# Patient Record
Sex: FEMALE | Race: WHITE | NOT HISPANIC OR LATINO | Employment: STUDENT | URBAN - METROPOLITAN AREA
[De-identification: names, ages, dates, MRNs, and addresses within clinical notes are randomized per-mention and may not be internally consistent; named-entity substitution may affect disease eponyms.]

---

## 2023-05-24 ENCOUNTER — OFFICE VISIT (OUTPATIENT)
Dept: PRIMARY CARE | Facility: CLINIC | Age: 23
End: 2023-05-24
Payer: COMMERCIAL

## 2023-05-24 VITALS
DIASTOLIC BLOOD PRESSURE: 80 MMHG | WEIGHT: 216 LBS | HEIGHT: 68 IN | TEMPERATURE: 96.8 F | HEART RATE: 91 BPM | OXYGEN SATURATION: 99 % | BODY MASS INDEX: 32.74 KG/M2 | SYSTOLIC BLOOD PRESSURE: 120 MMHG

## 2023-05-24 DIAGNOSIS — K52.9 ACUTE GASTROENTERITIS: Primary | ICD-10-CM

## 2023-05-24 PROCEDURE — 1036F TOBACCO NON-USER: CPT | Performed by: FAMILY MEDICINE

## 2023-05-24 PROCEDURE — 99213 OFFICE O/P EST LOW 20 MIN: CPT | Performed by: FAMILY MEDICINE

## 2023-05-24 RX ORDER — ONDANSETRON 4 MG/1
4 TABLET, ORALLY DISINTEGRATING ORAL EVERY 4 HOURS PRN
COMMUNITY
Start: 2023-05-21 | End: 2023-05-24 | Stop reason: ALTCHOICE

## 2023-05-24 RX ORDER — DICYCLOMINE HYDROCHLORIDE 10 MG/1
CAPSULE ORAL
COMMUNITY
Start: 2022-11-21 | End: 2023-05-24 | Stop reason: ALTCHOICE

## 2023-05-24 RX ORDER — AZELASTINE 1 MG/ML
SPRAY, METERED NASAL 2 TIMES DAILY
COMMUNITY
Start: 2023-05-23 | End: 2024-06-06 | Stop reason: SDUPTHER

## 2023-05-24 ASSESSMENT — LIFESTYLE VARIABLES
HOW OFTEN DO YOU HAVE SIX OR MORE DRINKS ON ONE OCCASION: NEVER
HOW OFTEN DO YOU HAVE A DRINK CONTAINING ALCOHOL: NEVER

## 2023-05-24 ASSESSMENT — ENCOUNTER SYMPTOMS
DIARRHEA: 1
NAUSEA: 1

## 2023-05-24 ASSESSMENT — PATIENT HEALTH QUESTIONNAIRE - PHQ9
2. FEELING DOWN, DEPRESSED OR HOPELESS: NOT AT ALL
1. LITTLE INTEREST OR PLEASURE IN DOING THINGS: NOT AT ALL
SUM OF ALL RESPONSES TO PHQ9 QUESTIONS 1 & 2: 0

## 2023-05-24 ASSESSMENT — PAIN SCALES - GENERAL: PAINLEVEL: 0-NO PAIN

## 2023-05-24 NOTE — PROGRESS NOTES
Subjective   Patient ID: Becky Sanchez is a 22 y.o. female who presents for Follow-up (Patient is here for follow up from urgent care for vomiting ).  HPI  22-year-old female presents for urgent care follow-up for acute gastroenteritis most likely due to something she ate.  Symptoms mostly resolved, she is still having occasional nausea and diarrhea.  No fever or chills.  No abdominal pain.  Review of Systems   Gastrointestinal:  Positive for diarrhea and nausea.   All other systems reviewed and are negative.      Visit Vitals  /80 (BP Location: Left arm, Patient Position: Sitting, BP Cuff Size: Adult)   Pulse 91   Temp 36 °C (96.8 °F) (Temporal)        Objective   Physical Exam  Vitals reviewed.   Constitutional:       Appearance: Normal appearance.   Cardiovascular:      Rate and Rhythm: Normal rate and regular rhythm.      Heart sounds: Normal heart sounds.   Pulmonary:      Breath sounds: Normal breath sounds.   Abdominal:      General: Bowel sounds are normal. There is no distension.      Palpations: Abdomen is soft.      Tenderness: There is no abdominal tenderness. There is no guarding or rebound.   Neurological:      Mental Status: She is alert.         Assessment/Plan   Problem List Items Addressed This Visit          Digestive    Acute gastroenteritis - Primary        Over-the-counter Nexium for 2 weeks for acute gastroenteritis.  Imodium if necessary.

## 2023-07-19 ENCOUNTER — APPOINTMENT (OUTPATIENT)
Dept: PRIMARY CARE | Facility: CLINIC | Age: 23
End: 2023-07-19
Payer: COMMERCIAL

## 2023-08-04 ENCOUNTER — OFFICE VISIT (OUTPATIENT)
Dept: PRIMARY CARE | Facility: CLINIC | Age: 23
End: 2023-08-04
Payer: COMMERCIAL

## 2023-08-04 VITALS
BODY MASS INDEX: 32.89 KG/M2 | OXYGEN SATURATION: 99 % | HEART RATE: 96 BPM | TEMPERATURE: 97.7 F | SYSTOLIC BLOOD PRESSURE: 118 MMHG | WEIGHT: 217 LBS | DIASTOLIC BLOOD PRESSURE: 86 MMHG | HEIGHT: 68 IN

## 2023-08-04 DIAGNOSIS — R35.0 URINARY FREQUENCY: ICD-10-CM

## 2023-08-04 DIAGNOSIS — Z00.00 ROUTINE GENERAL MEDICAL EXAMINATION AT A HEALTH CARE FACILITY: Primary | ICD-10-CM

## 2023-08-04 PROBLEM — R10.30 INTERMITTENT LOWER ABDOMINAL PAIN: Status: ACTIVE | Noted: 2023-08-04

## 2023-08-04 PROBLEM — H93.8X3 SENSATION OF FULLNESS IN BOTH EARS: Status: ACTIVE | Noted: 2023-08-04

## 2023-08-04 PROBLEM — N89.8 VAGINAL ITCHING: Status: ACTIVE | Noted: 2023-08-04

## 2023-08-04 PROBLEM — J02.9 SORE THROAT: Status: ACTIVE | Noted: 2023-08-04

## 2023-08-04 PROBLEM — B34.9 VIRAL ILLNESS: Status: ACTIVE | Noted: 2023-08-04

## 2023-08-04 PROBLEM — H66.91 OTITIS MEDIA, RIGHT: Status: ACTIVE | Noted: 2023-08-04

## 2023-08-04 PROBLEM — H92.03 OTALGIA OF BOTH EARS: Status: ACTIVE | Noted: 2023-08-04

## 2023-08-04 PROBLEM — R09.81 NASAL CONGESTION: Status: ACTIVE | Noted: 2023-08-04

## 2023-08-04 PROBLEM — F41.1 ANXIETY, GENERALIZED: Status: ACTIVE | Noted: 2023-08-04

## 2023-08-04 PROBLEM — R10.13 DYSPEPSIA: Status: ACTIVE | Noted: 2023-08-04

## 2023-08-04 PROBLEM — J30.2 SEASONAL ALLERGIC RHINITIS: Status: ACTIVE | Noted: 2023-08-04

## 2023-08-04 PROBLEM — H90.3 BILATERAL SENSORINEURAL HEARING LOSS: Status: ACTIVE | Noted: 2023-08-04

## 2023-08-04 PROBLEM — J34.89 NASAL DRAINAGE: Status: ACTIVE | Noted: 2023-08-04

## 2023-08-04 PROBLEM — H69.93 DYSFUNCTION OF BOTH EUSTACHIAN TUBES: Status: ACTIVE | Noted: 2023-08-04

## 2023-08-04 PROCEDURE — 99395 PREV VISIT EST AGE 18-39: CPT | Performed by: FAMILY MEDICINE

## 2023-08-04 PROCEDURE — 1036F TOBACCO NON-USER: CPT | Performed by: FAMILY MEDICINE

## 2023-08-04 PROCEDURE — 87086 URINE CULTURE/COLONY COUNT: CPT

## 2023-08-04 RX ORDER — DICYCLOMINE HYDROCHLORIDE 10 MG/1
CAPSULE ORAL
COMMUNITY
Start: 2022-11-21 | End: 2023-08-04 | Stop reason: ALTCHOICE

## 2023-08-04 RX ORDER — SPIRONOLACTONE 100 MG/1
TABLET, FILM COATED ORAL
COMMUNITY
Start: 2023-07-30

## 2023-08-04 RX ORDER — MONTELUKAST SODIUM 10 MG/1
10 TABLET ORAL NIGHTLY
COMMUNITY
Start: 2023-07-27 | End: 2023-10-12 | Stop reason: SDUPTHER

## 2023-08-04 ASSESSMENT — PAIN SCALES - GENERAL: PAINLEVEL: 0-NO PAIN

## 2023-08-04 NOTE — PROGRESS NOTES
Subjective   Patient ID: Becky Sanchez is a 22 y.o. female who presents for Annual Exam (Patient is here for CPE, patient is Not fasting. ).  HPI  22-year-old female for complete physical exam.  Her only complaint is increased urinary frequency.  Review of Systems  Constitutional: no chills, no fever and no night sweats.   Eyes: no blurred vision and no eyesight problems.   ENT: no hearing loss, no nasal congestion, no nasal discharge, no hoarseness and no sore throat.   Cardiovascular: no chest pain, no intermittent leg claudication, no lower extremity edema, no palpitations and no syncope.   Respiratory: no cough, no shortness of breath during exertion, no shortness of breath at rest and no wheezing.   Gastrointestinal: no abdominal pain, no blood in stools, no constipation, no diarrhea, no melena, no nausea, no rectal pain and no vomiting.   Genitourinary: no dysuria, no change in urinary frequency, no urinary hesitancy and no feelings of urinary urgency.   Neurological: no difficulty walking, no headache, no limb weakness, no numbness and no tingling.   Psychiatric: no anxiety, no depression, no anhedonia and no substance use disorders.   Endocrine: no recent weight gain and no recent weight loss.   Hematologic/Lymphatic: no tendency for easy bruising and no swollen glands.  Visit Vitals  /90 (BP Location: Left arm, Patient Position: Sitting, BP Cuff Size: Adult)   Pulse 96   Temp 36.5 °C (97.7 °F) (Temporal)        Objective   Physical Exam  Constitutional: Alert and in no acute distress. Well developed, well nourished.   Eyes: Pupils were equal in size, round, reactive to light (PERRL) with normal accommodation and extraocular movements intact (EOMI). Ophthalmoscopic examination: Normal.   Ears, Nose, Mouth, and Throat: External inspection of ears and nose: Normal. Otoscopic examination: Normal. Lips, teeth, and gums: Normal. Oropharynx: Normal.   Neck: No neck mass was observed. Supple. Thyroid not enlarged  and there were no palpable thyroid nodules.   Cardiovascular: Heart rate and rhythm were normal, normal S1 and S2, no gallops, no murmurs and no pericardial rub. Carotid pulses: Normal with no bruits. Abdominal aorta: Normal. Pedal pulses: Normal. No peripheral edema.   Pulmonary: No respiratory distress. Clear bilateral breath sounds.   Abdomen: Soft nontender; no abdominal mass palpated. Normal bowel sounds. No organomegaly.   Skin: Normal skin color and pigmentation, normal skin turgor, and no rash.   Psychiatric: Judgment and insight: Intact. Mood and affect: Normal.  Assessment/Plan   Problem List Items Addressed This Visit       Routine general medical examination at a health care facility - Primary    Urinary frequency    Relevant Orders    Urine Culture

## 2023-08-05 LAB — URINE CULTURE: NORMAL

## 2023-10-12 DIAGNOSIS — J30.1 SEASONAL ALLERGIC RHINITIS DUE TO POLLEN: Primary | ICD-10-CM

## 2023-10-12 RX ORDER — MONTELUKAST SODIUM 10 MG/1
10 TABLET ORAL NIGHTLY
Qty: 90 TABLET | Refills: 1 | Status: SHIPPED | OUTPATIENT
Start: 2023-10-12

## 2023-12-28 ENCOUNTER — TELEPHONE (OUTPATIENT)
Dept: OBSTETRICS AND GYNECOLOGY | Facility: CLINIC | Age: 23
End: 2023-12-28
Payer: COMMERCIAL

## 2024-01-02 ENCOUNTER — TELEPHONE (OUTPATIENT)
Dept: OBSTETRICS AND GYNECOLOGY | Facility: CLINIC | Age: 24
End: 2024-01-02
Payer: COMMERCIAL

## 2024-01-02 DIAGNOSIS — N92.1 IRREGULAR INTERMENSTRUAL BLEEDING: Primary | ICD-10-CM

## 2024-01-02 NOTE — TELEPHONE ENCOUNTER
Patient is calling wanting to get in before she goes back to school on the 17th for vaginal spotting. Does not take birth control. We do not have anything available before then. She would like to speak with you regarding some family history of cancer on both mom and dads side so she is concerned about the spotting.

## 2024-01-03 NOTE — TELEPHONE ENCOUNTER
Left a voicemail.  Reassurance given that spotting in a 23-year-old is not a concern for cancer.  It will most typically be hormonal.  I ordered a pelvic ultrasound.  Also blood work for TSH, prolactin, DHEA-S and hemoglobin A1c.

## 2024-01-03 NOTE — TELEPHONE ENCOUNTER
I left a voicemail at the number listed which was for the patient.  It appears she is not on contraception.  If it is a cyclical/midcycle bleeding, then is likely from ovulation.  If there is pain, discharge, odor then other sources such as infection need to be considered..  I recommend call if her symptoms are worsening.  She may want to consider contraception such as birth control pills to help with cycle regularity and acne.

## 2024-01-04 ENCOUNTER — LAB (OUTPATIENT)
Dept: LAB | Facility: LAB | Age: 24
End: 2024-01-04
Payer: COMMERCIAL

## 2024-01-04 DIAGNOSIS — N92.1 IRREGULAR INTERMENSTRUAL BLEEDING: ICD-10-CM

## 2024-01-04 LAB
DHEA-S SERPL-MCNC: 194 UG/DL (ref 65–395)
EST. AVERAGE GLUCOSE BLD GHB EST-MCNC: 82 MG/DL
HBA1C MFR BLD: 4.5 %
PROLACTIN SERPL-MCNC: 9.6 UG/L (ref 3–20)
TSH SERPL-ACNC: 3.25 MIU/L (ref 0.44–3.98)

## 2024-01-04 PROCEDURE — 36415 COLL VENOUS BLD VENIPUNCTURE: CPT

## 2024-01-04 PROCEDURE — 82627 DEHYDROEPIANDROSTERONE: CPT

## 2024-01-04 PROCEDURE — 84146 ASSAY OF PROLACTIN: CPT

## 2024-01-04 PROCEDURE — 84443 ASSAY THYROID STIM HORMONE: CPT

## 2024-01-04 PROCEDURE — 83036 HEMOGLOBIN GLYCOSYLATED A1C: CPT

## 2024-06-06 ENCOUNTER — OFFICE VISIT (OUTPATIENT)
Dept: ALLERGY | Facility: CLINIC | Age: 24
End: 2024-06-06
Payer: COMMERCIAL

## 2024-06-06 VITALS
SYSTOLIC BLOOD PRESSURE: 116 MMHG | BODY MASS INDEX: 33.8 KG/M2 | HEIGHT: 68 IN | OXYGEN SATURATION: 98 % | HEART RATE: 74 BPM | TEMPERATURE: 98.4 F | RESPIRATION RATE: 17 BRPM | WEIGHT: 223 LBS | DIASTOLIC BLOOD PRESSURE: 80 MMHG

## 2024-06-06 DIAGNOSIS — H10.13 ALLERGIC CONJUNCTIVITIS, BILATERAL: Primary | ICD-10-CM

## 2024-06-06 DIAGNOSIS — H10.13 ALLERGIC CONJUNCTIVITIS OF BOTH EYES: Primary | ICD-10-CM

## 2024-06-06 DIAGNOSIS — J30.81 ALLERGIC RHINITIS DUE TO ANIMAL (CAT) (DOG) HAIR AND DANDER: ICD-10-CM

## 2024-06-06 PROCEDURE — 99214 OFFICE O/P EST MOD 30 MIN: CPT | Performed by: ALLERGY & IMMUNOLOGY

## 2024-06-06 RX ORDER — AZELASTINE HYDROCHLORIDE 0.5 MG/ML
1 SOLUTION/ DROPS OPHTHALMIC 2 TIMES DAILY
Qty: 18 ML | Refills: 3 | Status: SHIPPED | OUTPATIENT
Start: 2024-06-06 | End: 2024-09-04

## 2024-06-06 RX ORDER — AZELASTINE 1 MG/ML
2 SPRAY, METERED NASAL 2 TIMES DAILY
Qty: 30 ML | Refills: 11 | Status: SHIPPED | OUTPATIENT
Start: 2024-06-06 | End: 2024-06-06

## 2024-06-06 RX ORDER — CLINDAMYCIN PHOSPHATE 10 UG/ML
LOTION TOPICAL 2 TIMES DAILY
COMMUNITY
Start: 2023-12-19

## 2024-06-06 RX ORDER — TRETINOIN 1 MG/G
CREAM TOPICAL NIGHTLY
COMMUNITY
Start: 2023-12-28

## 2024-06-06 RX ORDER — AZELASTINE 1 MG/ML
2 SPRAY, METERED NASAL 2 TIMES DAILY
Qty: 90 ML | Refills: 2 | Status: SHIPPED | OUTPATIENT
Start: 2024-06-06

## 2024-06-06 RX ORDER — AZELAIC ACID 0.15 G/G
1 GEL TOPICAL 2 TIMES DAILY
COMMUNITY
Start: 2023-12-28

## 2024-06-06 RX ORDER — KETOTIFEN FUMARATE 0.35 MG/ML
1 SOLUTION/ DROPS OPHTHALMIC 2 TIMES DAILY
Qty: 10 ML | Refills: 1 | Status: SHIPPED | OUTPATIENT
Start: 2024-06-06 | End: 2024-09-04

## 2024-06-06 NOTE — PROGRESS NOTES
Patient ID: Becky Sanchez is a 23 y.o. female.     Chief Complaint: follow up  History Of Present Illness  Becky Sanchez is a 23 y.o. female with PMx allergic rhinitis presenting for follow up.   Food Allergy  No    Eczema/ Atopic Dermatitis  No    Asthma  No    Rhinoconjunctivitis  Cat, dog, dust mite and grass.  Worse since being home this summer around her dog.  Doing a one year master's program and living at home.  Flonase, Xyzal (doesn't feel it works as well as Zyrtec).  Left eye has been getting a little red and itchy.    Drug Allergy   No    Insect Allergy   No    Infections  No history of frequent or recurrent infections      Review of Systems    Pertinent positives and negatives have been assessed in the HPI. All other systems have been reviewed and are negative except as noted in the HPI.    Allergies  Patient has no known allergies.    Past Medical History  She has a past medical history of Acute serous otitis media, left ear (03/13/2020), Acute upper respiratory infection, unspecified (01/27/2020), Headache, unspecified (04/15/2022), Nausea (12/06/2020), Otalgia, bilateral (01/27/2020), Otalgia, right ear (01/27/2020), Otitis media, unspecified, right ear (05/18/2022), Pain in right knee (09/21/2016), Personal history of other diseases of the digestive system (01/12/2022), Personal history of other diseases of the female genital tract (05/17/2022), Strain of muscle, fascia and tendon of the posterior muscle group at thigh level, unspecified thigh, initial encounter (06/17/2017), and Unspecified abdominal pain (12/06/2020).    Family History  No family history on file.    No history of food allergy or atopic disease in the family.    Surgical History  She has no past surgical history on file.    Social/Environmental History  She reports that she has never smoked. She has never used smokeless tobacco. She reports that she does not drink alcohol and does not use drugs.    Home: Lives in a house   Floors: Wood and  "carpeting mixed  Air Conditioning: Central  Smoker: No  Pets: Dog sleeps in her bedroom  Infestations: No  Molds: No  Occupation: student    MEDICATIONS  Current Outpatient Medications on File Prior to Visit   Medication Sig Dispense Refill    azelaic acid (Finacea) 15 % gel Apply 1 Application topically 2 times a day.      azelastine (Astelin) 137 mcg (0.1 %) nasal spray Administer into affected nostril(s) twice a day.      cetirizine (ZYRTEC) 10 mg capsule Take by mouth.      clindamycin (Cleocin T) 1 % lotion 2 times a day.      tretinoin (Retin-A) 0.1 % cream Apply topically once daily at bedtime.      montelukast (Singulair) 10 mg tablet Take 1 tablet (10 mg) by mouth once daily at bedtime. (Patient not taking: Reported on 6/6/2024) 90 tablet 1    spironolactone (Aldactone) 100 mg tablet        No current facility-administered medications on file prior to visit.         Physical Exam  Visit Vitals  /80   Pulse 74   Temp 36.9 °C (98.4 °F) (Temporal)   Resp 17   Ht 1.727 m (5' 8\")   Wt 101 kg (223 lb)   SpO2 98%   BMI 33.91 kg/m²   Smoking Status Never   BSA 2.2 m²       Wt Readings from Last 1 Encounters:   06/06/24 101 kg (223 lb)       Physical Exam    General: Well appearing, no acute distress  Head: Normocephalic, atraumatic, neck supple without lymphadenopathy  Eyes: EOMI, non-injected  Nose: No nasal crease, nares patent, slightly boggy turbinates, minimal discharge  Throat: Normal dentition, no erythema  Heart: Regular rate and rhythm  Lungs: Clear to auscultation bilaterally, effort normal  Abdomen: Soft, non-tender, normal bowel sounds  Extremities: Moves all extremities symmetrically, no edema  Skin: No rashes/lesions  Psych: normal mood and affect    LAB RESULTS:      HEME/ENDO:  Recent Labs     01/04/24  1036   TSH 3.25   HGBA1C 4.5     Assessment/Plan   Becky is a 24 yo woman with a history of allergic rhinitis to cat, dog, dust mite and grass. She has returned home after graduating from [place] [employer] and is working on her master's degree in teaching. At this time she is living with her parents and the family dog sleeps with her leading to an increase in symptoms. She is doubtful this will change.  -we discussed additional avoidance measures and I recommended daily allergy medications. Will add eye drops for intermittent allergic conjunctivitis symptoms  -we discussed the consideration for allergy immunotherapy.    Plan follow up yearly, but immunotherapy could be started at any time.    Yue Frank, DO

## 2024-06-06 NOTE — PATIENT INSTRUCTIONS
"Continue with flonase. Switch back to Zyrtec. Can use 2 times a day if needed, side effect will be tired.  Add in Astelin nasal spray as needed  Allergy eye drops as needed.      For dust mite allergy, be sure to wash your bedding, including your sheets, weekly in hot water, in order to reduce dust mites.  Encase your pillow and mattress in a hypoallergenic or anti-dust mite case.      You may consider a HEPA filter if you do not have one, and if possible, put it in the patient's bedroom.      If you have pets, avoid having them in the bedroom if possible.  Regular grooming and wiped the pet with a pet allergy wipe will help with dander and allergens on the pet's fur.  Vacuum regularly.    Keep humidity in the home 30-50% to decrease mold growth.  Clean bathrooms with a bleach solution and fix any leaking faucets.  You may also wipe down windowsills with  bleach solution.    For pollen allergy, keep windows closed and use central air.  You can consider wearing a hat and sunglasses as well to reduce pollen exposure.  After being outside, wash hands and face or shower to reduce the pollen on your body.  Wash clothing after wearing outside during the pollen seasons.       Allergy Shots     Allergen immunotherapy injections or \"allergy shots\" are prescribed for patients with allergic rhinitis (hay fever), allergic asthma or life threatening reactions to insect stings.  Immunotherapy is the only medical treatment that could potentially modify allergic disease.  Some studies have shown that it may have a preventive role in allergic children, possibly preventing asthma from developing in some patients with allergic rhinitis.  Immunotherapy would be considered for individuals, who have moderate or severe symptoms not adequately controlled by environmental control measures and/or medications.     Effectiveness    Allergen immunotherapy (allergy shots) may \"turn down\" allergic reactions to common allergens including pollens, " molds, animal dander and dust mites.  In most cases, the initial 6 to 12 month course of allergy shots is likely to gradually decrease sensitivity to airborne allergens and continuation of injections leads to further improvement.  The injections diminish sensitivities, resulting in fewer symptoms and use of fewer medications.  It is important to maintain shots at the proper time interval; missing your shots for a short time may be acceptable but an appropriate adjustment in the dose of vaccine may be necessary for long lapses in injections.  Please see us if you miss receiving your injections for longer than what is recommended for your current vial.     How long are shots given?     There are generally two phases to immunotherapy:  a build-up phase and a maintenance phase    Build-up phase: involves receiving injections with increasing amounts of the allergens.  The frequency of injections during this phase generally ranges from 1 to 2 times a week, though more rapid build-up schedules are sometimes used.  The duration of this phase depends on the frequency of the injections but generally ranges from 3 to 6 months (at a frequency of 2 times and 1 time a week, respectively).     Maintenance phase: This phase begins when the effective therapeutic dose is reached.  The effective therapeutic dose is based on recommendations from a national collaborative committee called the Joint Task Force for Practice Parameters: Allergen immunotherapy: A Practice Parameter and was determined after review of a number of published studies on immunotherapy.  The effective maintenance dose may be individualized for a particular person based on their degree of sensitivity (how ' allergic they are' to the allergens in their vaccine) and their response to the immunotherapy build-up phase.  Once the target maintenance dose is reached, the intervals between the allergy injections can be increased.  The intervals between maintenance  immunotherapy injections generally ranges from every 2 to every 4 weeks but should be individualized to provide the best combination of effectiveness and safety for each person.  [place] intervals between allergy injections may lead to fewer reactions an greater benefit in some people and some individuals may tolerate intervals longer than four weeks between injections.     Reactions to allergy injections    It is possible to have an allergic reaction to the allergy injection itself.  Reactions can be local (swelling at the injection site) or systemic (affecting the rest of the body).  Systemic reactions include hay fever type symptoms, hives, flushing, lightheadedness, and/or asthma, and rarely, life threatening reactions.   Some condition can make allergic reactions to the injections more likely: heavy natural exposure to pollen during a pollen season and exercise after an injection.  Serious systemic reactions can occur in patients with asthma that has worsened and is not well controlled on recommended medications.  Therefore, if you have noted worsening of your asthma symptoms, notify your nurse or physician before receiving your scheduled injections!  Reactions to injections can occur, however, even in the absence of these conditions.      Please inform the nursing staff if you have been diagnosed with a new medical condition or prescribed any new medications since your last visit.  If any symptoms occur immediately or within hours of your injection, please inform the nurse before you receive your next injection.                   CHARGES    1. Charges for vaccine depend on the concentration and number of vials.  2. All vaccine must be paid for at the time it is ordered.  3. If we have a contract with your insurance plan, we will submit the claim.  4. If your vaccine is covered under a prescription plan, you will need to pay for your vaccine up front.  Please obtain a form from your employer and we will fill it  out and submit it to your prescription plan.  5. Any co-payments or deductibles not covered by your insurance company are your responsibility.  Some insurance plans do not cover allergy vaccine.  Please check with your insurance company about coverage before starting vaccine therapy.  6. If you ask us to make your vaccine without proper insurance authorization or you are not sure whether you are going to start allergy injections you will be responsible for the cost.    OUR RESPONSIBILITY    It is our responsibility to be sure you are receiving the correct vaccine and the correct dosage.  We will treat any problems that may arise from your allergy injections.  We will answer concerns you may have about your individual course of treatment.  Our office is available to answer questions you may have regarding your allergy injections.  Please call us if you have any concerns, but for any emergencies, please call [phone].    YOUR RESPONSIBILITY    1. You need to let us know if you have any difficulty with your injections, including a local skin reaction to the injection, worsening of your allergy symptoms after an injection or signs of a systemic reaction.  2. Do not get your allergy shot if you are ill.  If you have a temperature or coughing, wheezing or experiencing shortness of breath up to forty-eight hours before your injection, you will not receive your allergy injection.  Please call us before you come in and we can let you know whether you should postpone your injection.  3. Do not participate in excessive activity/exercise for two to three hours after your injection.  This may increase absorption of the injection and may lead to an adverse reaction.  4. You are required to wait thirty minutes after your injection.  There are no exceptions to this rule.  If you are unable to stay the thirty minutes, please reschedule your injection appointment.    5. You must have your injection site checked by the medical assistant or  nurse prior to leaving the office.  6. If you experience any symptoms that make you think you are having a reaction to the injections, please let us know immediately.  Do not wait thirty minutes to let us know.  7. Please make us aware of any changes in your medications, especially if related to blood pressure, migraine headaches or heart conditions.

## 2024-08-12 ENCOUNTER — APPOINTMENT (OUTPATIENT)
Dept: PRIMARY CARE | Facility: CLINIC | Age: 24
End: 2024-08-12
Payer: COMMERCIAL

## 2024-08-12 VITALS
HEART RATE: 94 BPM | TEMPERATURE: 97.3 F | WEIGHT: 221 LBS | BODY MASS INDEX: 33.49 KG/M2 | OXYGEN SATURATION: 100 % | DIASTOLIC BLOOD PRESSURE: 78 MMHG | SYSTOLIC BLOOD PRESSURE: 120 MMHG | HEIGHT: 68 IN

## 2024-08-12 DIAGNOSIS — Z00.00 ROUTINE GENERAL MEDICAL EXAMINATION AT A HEALTH CARE FACILITY: Primary | ICD-10-CM

## 2024-08-12 PROCEDURE — 3008F BODY MASS INDEX DOCD: CPT | Performed by: FAMILY MEDICINE

## 2024-08-12 PROCEDURE — 99395 PREV VISIT EST AGE 18-39: CPT | Performed by: FAMILY MEDICINE

## 2024-08-12 PROCEDURE — 1036F TOBACCO NON-USER: CPT | Performed by: FAMILY MEDICINE

## 2024-08-12 ASSESSMENT — PAIN SCALES - GENERAL: PAINLEVEL: 0-NO PAIN

## 2024-08-12 NOTE — PROGRESS NOTES
Subjective   Patient ID: Becky Sanchez is a 23 y.o. female who presents for Annual Exam (Patient is present today for annual physical exam.).  HPI  23-year-old female presents for complete physical exam.  No acute complaints  Review of Systems  Constitutional: no chills, no fever and no night sweats.   Eyes: no blurred vision and no eyesight problems.   ENT: no hearing loss, no nasal congestion, no nasal discharge, no hoarseness and no sore throat.   Cardiovascular: no chest pain, no intermittent leg claudication, no lower extremity edema, no palpitations and no syncope.   Respiratory: no cough, no shortness of breath during exertion, no shortness of breath at rest and no wheezing.   Gastrointestinal: no abdominal pain, no blood in stools, no constipation, no diarrhea, no melena, no nausea, no rectal pain and no vomiting.   Genitourinary: no dysuria, no change in urinary frequency, no urinary hesitancy and no feelings of urinary urgency.   Neurological: no difficulty walking, no headache, no limb weakness, no numbness and no tingling.   Psychiatric: no anxiety, no depression, no anhedonia and no substance use disorders.   Endocrine: no recent weight gain and no recent weight loss.   Hematologic/Lymphatic: no tendency for easy bruising and no swollen glands.  Visit Vitals  /78 (BP Location: Left arm, Patient Position: Sitting, BP Cuff Size: Large adult)   Pulse 94   Temp 36.3 °C (97.3 °F) (Temporal)        Objective   Physical Exam  Constitutional: Alert and in no acute distress. Well developed, well nourished.   Eyes: Pupils were equal in size, round, reactive to light (PERRL) with normal accommodation and extraocular movements intact (EOMI). Ophthalmoscopic examination: Normal.   Ears, Nose, Mouth, and Throat: External inspection of ears and nose: Normal. Otoscopic examination: Normal. Lips, teeth, and gums: Normal. Oropharynx: Normal.   Neck: No neck mass was observed. Supple. Thyroid not enlarged and there were  no palpable thyroid nodules.   Cardiovascular: Heart rate and rhythm were normal, normal S1 and S2, no gallops, no murmurs and no pericardial rub. Carotid pulses: Normal with no bruits. Abdominal aorta: Normal. Pedal pulses: Normal. No peripheral edema.   Pulmonary: No respiratory distress. Clear bilateral breath sounds.   Abdomen: Soft nontender; no abdominal mass palpated. Normal bowel sounds. No organomegaly.   Skin: Normal skin color and pigmentation, normal skin turgor, and no rash.   Psychiatric: Judgment and insight: Intact. Mood and affect: Normal.  Assessment/Plan   Problem List Items Addressed This Visit             ICD-10-CM    Routine general medical examination at a health care facility - Primary Z00.00

## 2024-09-10 DIAGNOSIS — J30.81 ALLERGIC RHINITIS DUE TO ANIMAL (CAT) (DOG) HAIR AND DANDER: ICD-10-CM

## 2024-09-10 RX ORDER — AZELASTINE 1 MG/ML
2 SPRAY, METERED NASAL 2 TIMES DAILY
Qty: 90 ML | Refills: 2 | Status: SHIPPED | OUTPATIENT
Start: 2024-09-10

## 2024-09-13 ENCOUNTER — OFFICE VISIT (OUTPATIENT)
Dept: PRIMARY CARE | Facility: CLINIC | Age: 24
End: 2024-09-13
Payer: COMMERCIAL

## 2024-09-13 VITALS
OXYGEN SATURATION: 98 % | DIASTOLIC BLOOD PRESSURE: 78 MMHG | WEIGHT: 224 LBS | TEMPERATURE: 97.5 F | BODY MASS INDEX: 33.95 KG/M2 | SYSTOLIC BLOOD PRESSURE: 118 MMHG | HEIGHT: 68 IN | HEART RATE: 78 BPM

## 2024-09-13 DIAGNOSIS — R10.13 DYSPEPSIA: Primary | ICD-10-CM

## 2024-09-13 PROCEDURE — 3008F BODY MASS INDEX DOCD: CPT | Performed by: FAMILY MEDICINE

## 2024-09-13 PROCEDURE — 99213 OFFICE O/P EST LOW 20 MIN: CPT | Performed by: FAMILY MEDICINE

## 2024-09-13 PROCEDURE — 1036F TOBACCO NON-USER: CPT | Performed by: FAMILY MEDICINE

## 2024-09-13 RX ORDER — CLASCOTERONE 1 G/100G
CREAM TOPICAL
COMMUNITY
Start: 2024-08-30

## 2024-09-13 ASSESSMENT — PAIN SCALES - GENERAL: PAINLEVEL: 3

## 2024-09-13 NOTE — PROGRESS NOTES
Subjective   Patient ID: Becky Sanchez is a 23 y.o. female who presents for Nausea (Patient is present today due to being nauseous for the last 3 weeks in the morning.).  HPI  23-year-old female for flareup of nausea and stomach cramps in the morning.  Usually goes away throughout the day.  No fever or chills.  No diarrhea.  Review of Systems  See HPI  Visit Vitals  /78 (BP Location: Left arm, Patient Position: Sitting, BP Cuff Size: Large adult)   Pulse 78   Temp 36.4 °C (97.5 °F) (Temporal)        Objective   Physical Exam  Vitals reviewed.   Constitutional:       Appearance: Normal appearance.   Cardiovascular:      Rate and Rhythm: Normal rate.      Heart sounds: Normal heart sounds.   Pulmonary:      Breath sounds: Normal breath sounds.   Abdominal:      General: There is no distension.      Palpations: Abdomen is soft.      Tenderness: There is no abdominal tenderness. There is no guarding or rebound.   Neurological:      Mental Status: She is alert.         Assessment/Plan   Problem List Items Addressed This Visit             ICD-10-CM    Dyspepsia - Primary R10.13     Over-the-counter proton pump inhibitor for 2 weeks.  Call if no improvement.  [place] diet.

## 2025-08-12 ENCOUNTER — APPOINTMENT (OUTPATIENT)
Dept: PRIMARY CARE | Facility: CLINIC | Age: 25
End: 2025-08-12

## 2025-08-12 VITALS
WEIGHT: 219 LBS | OXYGEN SATURATION: 98 % | DIASTOLIC BLOOD PRESSURE: 83 MMHG | BODY MASS INDEX: 33.19 KG/M2 | HEART RATE: 76 BPM | TEMPERATURE: 98.7 F | SYSTOLIC BLOOD PRESSURE: 121 MMHG | HEIGHT: 68 IN

## 2025-08-12 DIAGNOSIS — Z00.00 ROUTINE GENERAL MEDICAL EXAMINATION AT A HEALTH CARE FACILITY: Primary | ICD-10-CM

## 2025-08-12 PROCEDURE — 99395 PREV VISIT EST AGE 18-39: CPT | Performed by: FAMILY MEDICINE

## 2025-08-12 PROCEDURE — 3008F BODY MASS INDEX DOCD: CPT | Performed by: FAMILY MEDICINE

## 2025-08-12 PROCEDURE — 1036F TOBACCO NON-USER: CPT | Performed by: FAMILY MEDICINE

## 2025-08-12 ASSESSMENT — ENCOUNTER SYMPTOMS
LOSS OF SENSATION IN FEET: 0
OCCASIONAL FEELINGS OF UNSTEADINESS: 0
DEPRESSION: 0

## 2025-08-12 ASSESSMENT — PAIN SCALES - GENERAL: PAINLEVEL_OUTOF10: 0-NO PAIN

## 2025-08-12 ASSESSMENT — PATIENT HEALTH QUESTIONNAIRE - PHQ9
1. LITTLE INTEREST OR PLEASURE IN DOING THINGS: NOT AT ALL
SUM OF ALL RESPONSES TO PHQ9 QUESTIONS 1 AND 2: 0
2. FEELING DOWN, DEPRESSED OR HOPELESS: NOT AT ALL